# Patient Record
Sex: MALE | Race: WHITE
[De-identification: names, ages, dates, MRNs, and addresses within clinical notes are randomized per-mention and may not be internally consistent; named-entity substitution may affect disease eponyms.]

---

## 2020-07-04 ENCOUNTER — HOSPITAL ENCOUNTER (INPATIENT)
Dept: HOSPITAL 7 - FB.ED | Age: 75
LOS: 3 days | DRG: 682 | End: 2020-07-07
Attending: FAMILY MEDICINE | Admitting: FAMILY MEDICINE
Payer: MEDICARE

## 2020-07-04 DIAGNOSIS — I50.9: ICD-10-CM

## 2020-07-04 DIAGNOSIS — E87.6: ICD-10-CM
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
## 2020-07-04 DIAGNOSIS — R53.1: ICD-10-CM

## 2020-07-04 DIAGNOSIS — R15.9: ICD-10-CM

## 2020-07-04 DIAGNOSIS — Z90.49: ICD-10-CM

## 2020-07-04 DIAGNOSIS — I10: ICD-10-CM

## 2020-07-04 DIAGNOSIS — K72.00: ICD-10-CM

## 2020-07-04 DIAGNOSIS — Z90.89: ICD-10-CM

## 2020-07-04 DIAGNOSIS — F03.90: ICD-10-CM

## 2020-07-04 DIAGNOSIS — Z66: ICD-10-CM

## 2020-07-04 DIAGNOSIS — Z51.5: ICD-10-CM

## 2020-07-04 DIAGNOSIS — F17.210: ICD-10-CM

## 2020-07-04 DIAGNOSIS — R63.0: ICD-10-CM

## 2020-07-04 DIAGNOSIS — Z20.828: ICD-10-CM

## 2020-07-04 DIAGNOSIS — G93.41: ICD-10-CM

## 2020-07-04 DIAGNOSIS — R82.71: ICD-10-CM

## 2020-07-04 DIAGNOSIS — K72.90: ICD-10-CM

## 2020-07-04 DIAGNOSIS — R32: ICD-10-CM

## 2020-07-04 DIAGNOSIS — F32.9: ICD-10-CM

## 2020-07-04 DIAGNOSIS — Z79.899: ICD-10-CM

## 2020-07-04 DIAGNOSIS — F17.200: ICD-10-CM

## 2020-07-04 DIAGNOSIS — I11.0: ICD-10-CM

## 2020-07-04 DIAGNOSIS — E86.0: ICD-10-CM

## 2020-07-04 DIAGNOSIS — N17.9: Primary | ICD-10-CM

## 2020-07-04 DIAGNOSIS — R64: ICD-10-CM

## 2020-07-04 DIAGNOSIS — F10.21: ICD-10-CM

## 2020-07-04 DIAGNOSIS — Z79.82: ICD-10-CM

## 2020-07-04 PROCEDURE — U0002 COVID-19 LAB TEST NON-CDC: HCPCS

## 2020-07-04 RX ADMIN — Medication PRN ML: at 10:20

## 2020-07-04 RX ADMIN — Medication PRN ML: at 20:51

## 2020-07-04 RX ADMIN — SODIUM CHLORIDE AND POTASSIUM CHLORIDE SCH MLS/HR: .9; .15 SOLUTION INTRAVENOUS at 20:12

## 2020-07-04 RX ADMIN — SODIUM CHLORIDE AND POTASSIUM CHLORIDE SCH MLS/HR: .9; .15 SOLUTION INTRAVENOUS at 10:22

## 2020-07-04 RX ADMIN — POTASSIUM CHLORIDE SCH MLS/HR: 200 INJECTION, SOLUTION INTRAVENOUS at 18:32

## 2020-07-04 RX ADMIN — POTASSIUM CHLORIDE SCH MLS/HR: 200 INJECTION, SOLUTION INTRAVENOUS at 23:24

## 2020-07-04 NOTE — PCM.HP.2
H&P History of Present Illness





- General


Date of Service: 07/04/20


Admit Problem/Dx: 


                           Admission Diagnosis/Problem





Admission Diagnosis/Problem      Hypokalemia








Source of Information: Patient, Family, Old Records


History Limitations: Reports: No Limitations





- History of Present Illness


Initial Comments - Free Text/Narative: 


Jaspreet presents because of weakness, a fall at home, anorexia/weight loss. His 

wife reported that the symptoms have been progressively worsening over the last 

few months but last week he has hardly eaten anything. He has a history of 

hypertension, tobacco abuse. He does endorse feeling feeling depressed and tired

2 days lost excess amount of weight. There's been no vomiting ,but he does 

endorse chronic diarrhea. No chest pain or shortness of breath.





- Related Data


Allergies/Adverse Reactions: 


                                    Allergies











Allergy/AdvReac Type Severity Reaction Status Date / Time


 


No Known Allergies Allergy   Unverified 07/04/20 09:11











Home Medications: 


                                    Home Meds





Aspirin 81 mg PO DAILY 07/04/20 [History]


Cholecalciferol (Vitamin D3) [D3-2000] 125 mcg PO DAILY 07/04/20 [History]


Iron 27 mg PO DAILY 07/04/20 [History]


Lisinopril/Hydrochlorothiazide [Lisinopril-Hctz 20-12.5 mg Tab] 1 tab DAILY 0

7/04/20 [History]











Past Medical History


HEENT History: Reports: Epistaxis


Cardiovascular History: Reports: Hypertension


Gastrointestinal History: Reports: None


Genitourinary History: Reports: None


Neurological History: Reports: None


Psychiatric History: Reports: Addiction, Dementia, Depression


Other Psychiatric History: hx etoh abuse





- Infectious Disease History


Infectious Disease History: Reports: Chicken Pox, Measles, Mumps





- Past Surgical History


HEENT Surgical History: Reports: Tonsillectomy, Other (See Below)


Other HEENT Surgeries/Procedures: remove teeth for dentures


Cardiovascular Surgical History: Reports: None


GI Surgical History: Reports: Appendectomy


Male  Surgical History: Reports: Circumcision


Neurological Surgical History: Reports: None


Dermatological Surgical History: Reports: None





Social & Family History





- Family History


Family Medical History: Noncontributory





- Tobacco Use


Smoking Status *Q: Current Every Day Smoker


Years of Tobacco use: 60


Packs/Tins Daily: 0.5


Used Tobacco, but Quit: No


Second Hand Smoke Exposure: No





- Caffeine Use


Caffeine Use: Reports: Soda





- Recreational Drug Use


Recreational Drug Use: No





H&P Review of Systems





- Review of Systems:


Review Of Systems: Comprehensive ROS is negative, except as noted in HPI.





Exam





- Exam


Exam: See Below





- Vital Signs


Vital Signs: 


                                Last Vital Signs











Temp  97 F   07/04/20 16:00


 


Pulse  56 L  07/04/20 16:00


 


Resp  16   07/04/20 16:00


 


BP  99/62   07/04/20 16:00


 


Pulse Ox  96   07/04/20 16:00











Weight: 51.982 kg





- Exam


Quality Assessment: Skin Breakdown


General: Lethargic, Other (Cachetitic,poorly groomed.)


HEENT: PERRLA, Hearing Intact, Mucosa Moist & Pink, Nares Patent, Normal Nasal 

Septum, Posterior Pharynx Clear, Conjunctiva Clear, EOMI, EACs Clear, TMs Clear


Neck: Supple, Trachea Midline, 2


Lungs: Clear to Auscultation, Normal Respiratory Effort


Cardiovascular: Regular Rate, Regular Rhythm


GI/Abdominal Exam: Normal Bowel Sounds, Soft, Non-Tender, No Organomegaly, No 

Distention, No Abnormal Bruit, No Mass, Pelvis Stable


 (Male) Exam: Deferred


Rectal (Males) Exam: Deferred


Back Exam: Normal Inspection, Full Range of Motion, NT


Extremities: Normal Inspection, Normal Range of Motion, Non-Tender, No Pedal 

Edema, Normal Capillary Refill


Skin: Warm, Dry, Intact


Neurological: Cranial Nerves Intact, Reflexes Equal Bilateral


Neuro Extensive - Mental Status: Alert, Oriented x3, Normal Mood/Affect, Normal 

Cognition


Neuro Extensive - Motor, Sensory, Reflexes: CN II-XII Intact, Normal Gait, 

Normal Reflexes


Psychiatric: Alert, Normal Affect, Normal Mood





- Patient Data


Lab Results Last 24 hrs: 


                         Laboratory Results - last 24 hr











  07/04/20 07/04/20 Range/Units





  09:00 09:00 


 


WBC   12.4 H  (4.5-12.0)  X10-3/uL


 


RBC   4.16 L  (4.30-5.75)  x10(6)uL


 


Hgb   12.9 L  (13.5-17.8)  g/dL


 


Hct   39.3  (30.0-51.3)  %


 


MCV   94.7  (80-96)  fL


 


MCH   31.0  (27.7-33.6)  pg


 


MCHC   32.8  (32.2-35.4)  g/dL


 


RDW   15.4  (11.5-15.5)  %


 


Plt Count   318  (125-369)  X10(3)uL


 


MPV   8.6  (7.4-10.4)  fL


 


Neut % (Auto)   80.0  (46-82)  %


 


Lymph % (Auto)   15.3  (13-37)  %


 


Mono % (Auto)   4.2  (4-12)  %


 


Eos % (Auto)   0 L  (1.0-5.0)  %


 


Baso % (Auto)   0  (0-2)  %


 


Neut # (Auto)   10.0 H  (1.6-8.3)  #


 


Lymph # (Auto)   1.9  (0.6-5.0)  #


 


Mono # (Auto)   0.5  (0.0-1.3)  #


 


Eos # (Auto)   0.0  (0.0-0.8)  #


 


Baso # (Auto)   0.0  (0.0-0.2)  #


 


Sodium  141   (135-145)  mmol/L


 


Potassium  < 1.5 L*   (3.5-5.3)  mmol/L


 


Chloride  97 L   (100-110)  mmol/L


 


Carbon Dioxide  36 H   (21-32)  mmol/L


 


BUN  20 H   (7-18)  mg/dL


 


Creatinine  1.5 H   (0.70-1.30)  mg/dL


 


Est Cr Clr Drug Dosing  30.43   mL/min


 


Estimated GFR (MDRD)  45 L   (>60)  


 


BUN/Creatinine Ratio  13.3   (9-20)  


 


Glucose  123 H   ()  mg/dL


 


Calcium  7.5 L   (8.6-10.2)  mg/dL


 


Total Bilirubin  0.9   (0.1-1.3)  mg/dL


 


AST  45 H   (5-25)  IU/L


 


ALT  18   (12-36)  U/L


 


Alkaline Phosphatase  79   ()  IU/L


 


Total Protein  6.5   (6.0-8.0)  g/dL


 


Albumin  2.4 L   (3.2-4.6)  g/dL


 


Globulin  4.1   g/dL


 


Albumin/Globulin Ratio  0.6   











Result Diagrams: 


                                 07/04/20 09:00





                                 07/04/20 09:00





EKG INTERPRETATION


EKG Date: 07/04/20


Rhythm: NSR


ST-T: Depressed


Comparison: NA - No Prior EKG





Sepsis Event Note





- Evaluation


Sepsis Screening Result: No Definite Risk





- Focused Exam


Vital Signs: 


                                   Vital Signs











  Temp Temp Pulse Resp BP Pulse Ox


 


 07/04/20 16:00   97 F  56 L  16  99/62  96


 


 07/04/20 13:20  98.5 F   58 L  16  115/74  98


 


 07/04/20 12:03    58 L  18  147/102 H  98


 


 07/04/20 10:57    68  18  119/72  93 L


 


 07/04/20 09:31    51 L  18  120/58 L  99


 


 07/04/20 08:51   98.6 F  107 H  18  110/72  98











Date Exam was Performed: 07/04/20


Time Exam was Performed: 17:53





- Problem List


(1) Cachexia


SNOMED Code(s): 944469152


   ICD Code: R64 - CACHEXIA   Status: Acute   Current Visit: Yes   





(2) Tobacco abuse


SNOMED Code(s): 635843420


   ICD Code: Z72.0 - TOBACCO USE   Status: Acute   Current Visit: Yes   





(3) HTN (hypertension)


SNOMED Code(s): 27071664


   ICD Code: I10 - ESSENTIAL (PRIMARY) HYPERTENSION   Status: Acute   Current 

Visit: Yes   


Qualifiers: 


   Hypertension type: essential hypertension   Qualified Code(s): I10 - 

Essential (primary) hypertension   





(4) MDD (major depressive disorder)


SNOMED Code(s): 614427979


   ICD Code: F32.9 - MAJOR DEPRESSIVE DISORDER, SINGLE EPISODE, UNSPECIFIED   

Status: Acute   Current Visit: Yes   


Qualifiers: 


   Major depression recurrence: single episode 





(5) Hypokalemia


SNOMED Code(s): 85972222


   ICD Code: E87.6 - HYPOKALEMIA   Status: Acute   Current Visit: Yes   





(6) Anorexia


SNOMED Code(s): 17552318


   ICD Code: R63.0 - ANOREXIA   Status: Acute   Current Visit: Yes   





(7) DARREL (acute kidney injury)


SNOMED Code(s): 85393335, 84112895


   ICD Code: N17.9 - ACUTE KIDNEY FAILURE, UNSPECIFIED   Status: Acute   Current

 Visit: Yes   


Problem List Initiated/Reviewed/Updated: Yes


Orders Last 24hrs: 


                               Active Orders 24 hr











 Category Date Time Status


 


 Patient Status [ADT] Routine ADT  07/04/20 12:16 Active


 


 Oxygen Therapy [RC] PRN Care  07/04/20 12:16 Active


 


 Telemetry Monitoring [Cardiac Monitoring] [RC] .As Care  07/04/20 12:21 Active





 Directed   


 


 VTE/DVT Education [RC] Per Unit Routine Care  07/04/20 12:16 Active


 


 Vital Signs [RC] QSHIFT Care  07/04/20 12:16 Active


 


 Regular Diet [DIET] Diet  07/05/20 Breakfast Ordered


 


 Chest 1V Frontal [CR] Stat Exams  07/04/20 09:20 Taken


 


 Head wo Cont [CT] Stat Exams  07/04/20 09:20 Taken


 


 BASIC METABOLIC PANEL,BMP [CHEM] Routine Lab  07/04/20 18:00 Ordered


 


 CBC WITH AUTO DIFF [HEME] AM Lab  07/05/20 05:11 Ordered


 


 COMPREHENSIVE METABOLIC PN,CMP [CHEM] AM Lab  07/05/20 05:11 Ordered


 


 MAGNESIUM [CHEM] AM Lab  07/05/20 05:11 Ordered


 


 UA W/MICROSCOPIC [URIN] Routine Lab  07/04/20 09:19 Ordered


 


 Acetaminophen [Tylenol] Med  07/04/20 12:15 Active





 650 mg PO Q4H PRN   


 


 Aspirin [Halfprin] Med  07/05/20 09:00 Active





 81 mg PO DAILY   


 


 Cholecalciferol (Vitamin D3) [Vitamin D3] Med  07/05/20 09:00 Active





 125 mcg PO DAILY   


 


 Ibuprofen [Motrin] Med  07/04/20 12:15 Active





 600 mg PO Q6H PRN   


 


 Iron [Iron] Med  07/05/20 09:00 Pending





 27 mg PO DAILY   


 


 Megestrol [Megace 40 MG/ML Susp] Med  07/05/20 09:00 Active





 400 mg PO DAILY   


 


 NS + KCl 20mEq/L [Normal Saline with 20 mEq KCl] 1,000 Med  07/04/20 10:00 

Active





 ml   





 IV ASDIRECTED   


 


 Ondansetron [Zofran ODT] Med  07/04/20 12:15 Active





 4 mg PO Q4H PRN   


 


 Potassium Chloride [KCL 20 MEQ in Water 100 ML] 20 meq Med  07/04/20 18:00 

Active





 Premix Bag 1 bag   





 IV Q6H   


 


 Sodium Chloride 0.9% [Normal Saline] 1,000 ml Med  07/04/20 17:45 Active





 IV ONETIME   


 


 Sodium Chloride 0.9% [Saline Flush] Med  07/04/20 09:59 Active





 10 ml FLUSH ASDIRECTED PRN   


 


 Peripheral IV Insertion Adult [OM.PC] Routine Oth  07/04/20 09:59 Ordered


 


 Resuscitation Status Routine Resus Stat  07/04/20 12:15 Ordered








                                Medication Orders





Acetaminophen (Tylenol)  650 mg PO Q4H PRN


   PRN Reason: Pain (Mild 1-3)/fever


Aspirin (Halfprin)  81 mg PO DAILY FRANCIS


Cholecalciferol (Vitamin D3)  125 mcg PO DAILY FRANCIS


Potassium Chloride/Sodium Chloride (Normal Saline With 20 Meq Kcl)  1,000 mls @ 

100 mls/hr IV ASDIRECTED FRANCIS


   Last Admin: 07/04/20 10:22  Dose: 100 mls/hr


   Documented by: MAGDALENA


Sodium Chloride (Normal Saline)  1,000 mls @ 999 mls/hr IV ONETIME ONE


   Stop: 07/04/20 18:45


Potassium Chloride 20 meq/ (Premix)  100 mls @ 50 mls/hr IV Q6H FRANCIS


Ibuprofen (Motrin)  600 mg PO Q6H PRN


   PRN Reason: Pain (mild 1-3)


Megestrol Acetate (Megace 40 Mg/Ml Susp)  400 mg PO DAILY [facility]


Non-Formulary Medication (Iron [Iron])  27 mg PO DAILY FRANCIS


Ondansetron HCl (Zofran Odt)  4 mg PO Q4H PRN


   PRN Reason: nausea, able to take PO


Sodium Chloride (Saline Flush)  10 ml FLUSH ASDIRECTED PRN


   PRN Reason: Keep Vein Open


   Last Admin: 07/04/20 10:20  Dose: 10 ml


   Documented by: MAGDALENA








Assessment/Plan Comment:: 


I will give him fluids, and replace potassium. I've added Megace to help his 

appetite and will start an antidepressant,mirtazapine. He probably needs further

 workup possibly CT chest abdomen pelvis colonoscopy if he so agrees.

## 2020-07-04 NOTE — EDM.PDOC
ED HPI GENERAL MEDICAL PROBLEM





- General


Chief Complaint: General


Stated Complaint: WEAKNESS


Time Seen by Provider: 07/04/20 09:00


Source of Information: Reports: Patient, Family


History Limitations: Reports: Other (dementia)





- History of Present Illness


INITIAL COMMENTS - FREE TEXT/NARRATIVE: 





pt with Hx of dementia, comes by EMS from home with wife, with concerns for gen 

weakness, confusion, poor feeding and unwitnessed fall this morning with no 

injuries, pt here on arrival has stable vitals , alert and follow simple 

commands, he has dementia and unable to provide Hx, pt Hx was obtained from his 

wife, she report chronic confusion for the past 3 years and gradual decline in 

pt condition since then, states pt is hardly leaving his bed and hardly eat 

anything lately, states he is frequently incontinent to stool and urine, 

continue to smoke and denies alcohol use. 





- Related Data


                                    Allergies











Allergy/AdvReac Type Severity Reaction Status Date / Time


 


No Known Allergies Allergy   Unverified 07/04/20 09:11











Home Meds: 


                                    Home Meds





Aspirin 81 mg PO DAILY 07/04/20 [History]


Cholecalciferol (Vitamin D3) [Vitamin D3] 1,000 unit PO DAILY 07/04/20 [History]


Lisinopril/Hydrochlorothiazide [Lisinopril-Hctz 20-12.5 mg Tab] 1 tab DAILY 

07/04/20 [History]











Past Medical History


Cardiovascular History: Reports: Hypertension


Psychiatric History: Reports: Addiction, Dementia


Other Psychiatric History: hx etoh abuse





- Infectious Disease History


Infectious Disease History: Reports: Chicken Pox, Measles, Mumps





- Past Surgical History


GI Surgical History: Reports: Appendectomy





Social & Family History





- Family History


Family Medical History: Noncontributory





- Tobacco Use


Smoking Status *Q: Current Every Day Smoker


Years of Tobacco use: 55


Packs/Tins Daily: 1





- Caffeine Use


Caffeine Use: Reports: Soda





- Recreational Drug Use


Recreational Drug Use: No





ED ROS GENERAL





- Review of Systems


Review Of Systems: See Below (secondary to dementia.)


Constitutional: Denies: Fever





ED EXAM, GENERAL





- Physical Exam


Exam: See Below


Exam Limited By: Altered Mental Status


General Appearance: Mild Distress, Other (pt is disheveled and cachectic)


Ears: Normal External Exam


Nose: Normal Inspection, Normal Mucosa


Throat/Mouth: Normal Inspection


Head: Atraumatic


Neck: Normal Inspection, Supple


Respiratory/Chest: No Respiratory Distress, Lungs Clear


Cardiovascular: Normal Peripheral Pulses, Regular Rate, Rhythm


GI/Abdominal: Normal Bowel Sounds, Soft, Non-Tender, No Distention


Back Exam: Normal Inspection


Extremities: Normal Inspection, Normal Range of Motion


Neurological: Alert, CN II-XII Intact, Normal Reflexes.  No: Normal Cognition


Psychiatric: Anxious


Skin Exam: Warm





Course





- Vital Signs


Text/Narrative:: 





labs/ imaging / EKG results were explained to pt wife... pt has progressive 

deconditioning and critical hypokalemia .


will admit inpatient on tell , hold lisinopril/HCT , replace hypokalemia .


pt does not have advanced directives and his  wife indicated her wishes for a no

 code status for the pt. 


discussed with Dr Samuels and he was in acceptance of pt care.   


Last Recorded V/S: 


                                Last Vital Signs











Temp  37.0 C   07/04/20 08:51


 


Pulse  107 H  07/04/20 08:51


 


Resp  18   07/04/20 08:51


 


BP  110/72   07/04/20 08:51


 


Pulse Ox  98   07/04/20 08:51














- Orders/Labs/Meds


Orders: 


                               Active Orders 24 hr











 Category Date Time Status


 


 Chest 1V Frontal [CR] Stat Exams  07/04/20 09:20 Taken


 


 Head wo Cont [CT] Stat Exams  07/04/20 09:20 Taken


 


 UA W/MICROSCOPIC [URIN] Routine Lab  07/04/20 09:19 Ordered


 


 NS + KCl 20mEq/L [Normal Saline with 20 mEq KCl] 1,000 Med  07/04/20 10:00 

Active





 ml   





 IV ASDIRECTED   


 


 Potassium Chloride [KCL 20 MEQ in Water 100 ML] 20 meq Med  07/04/20 11:00 

Active





 Premix Bag 1 bag   





 IV ONETIME   


 


 Sodium Chloride 0.9% [Saline Flush] Med  07/04/20 09:59 Active





 10 ml FLUSH ASDIRECTED PRN   


 


 Peripheral IV Insertion Adult [OM.PC] Routine Oth  07/04/20 09:59 Ordered








                                Medication Orders





Potassium Chloride/Sodium Chloride (Normal Saline With 20 Meq Kcl)  1,000 mls @ 

100 mls/hr IV ASDIRECTED FRANCIS


   Last Admin: 07/04/20 10:22  Dose: 100 mls/hr


   Documented by: MAGDALENA


Potassium Chloride 20 meq/ (Premix)  100 mls @ 50 mls/hr IV ONETIME ONE


   Stop: 07/04/20 12:59


Sodium Chloride (Saline Flush)  10 ml FLUSH ASDIRECTED PRN


   PRN Reason: Keep Vein Open


   Last Admin: 07/04/20 10:20  Dose: 10 ml


   Documented by: MAGDALENA








Labs: 


                                Laboratory Tests











  07/04/20 07/04/20 Range/Units





  09:00 09:00 


 


WBC   12.4 H  (4.5-12.0)  X10-3/uL


 


RBC   4.16 L  (4.30-5.75)  x10(6)uL


 


Hgb   12.9 L  (13.5-17.8)  g/dL


 


Hct   39.3  (30.0-51.3)  %


 


MCV   94.7  (80-96)  fL


 


MCH   31.0  (27.7-33.6)  pg


 


MCHC   32.8  (32.2-35.4)  g/dL


 


RDW   15.4  (11.5-15.5)  %


 


Plt Count   318  (125-369)  X10(3)uL


 


MPV   8.6  (7.4-10.4)  fL


 


Neut % (Auto)   80.0  (46-82)  %


 


Lymph % (Auto)   15.3  (13-37)  %


 


Mono % (Auto)   4.2  (4-12)  %


 


Eos % (Auto)   0 L  (1.0-5.0)  %


 


Baso % (Auto)   0  (0-2)  %


 


Neut # (Auto)   10.0 H  (1.6-8.3)  #


 


Lymph # (Auto)   1.9  (0.6-5.0)  #


 


Mono # (Auto)   0.5  (0.0-1.3)  #


 


Eos # (Auto)   0.0  (0.0-0.8)  #


 


Baso # (Auto)   0.0  (0.0-0.2)  #


 


Sodium  141   (135-145)  mmol/L


 


Potassium  < 1.5 L*   (3.5-5.3)  mmol/L


 


Chloride  97 L   (100-110)  mmol/L


 


Carbon Dioxide  36 H   (21-32)  mmol/L


 


BUN  20 H   (7-18)  mg/dL


 


Creatinine  1.5 H   (0.70-1.30)  mg/dL


 


Est Cr Clr Drug Dosing  30.43   mL/min


 


Estimated GFR (MDRD)  45 L   (>60)  


 


BUN/Creatinine Ratio  13.3   (9-20)  


 


Glucose  123 H   ()  mg/dL


 


Calcium  7.5 L   (8.6-10.2)  mg/dL


 


Total Bilirubin  0.9   (0.1-1.3)  mg/dL


 


AST  45 H   (5-25)  IU/L


 


ALT  18   (12-36)  U/L


 


Alkaline Phosphatase  79   ()  IU/L


 


Total Protein  6.5   (6.0-8.0)  g/dL


 


Albumin  2.4 L   (3.2-4.6)  g/dL


 


Globulin  4.1   g/dL


 


Albumin/Globulin Ratio  0.6   











Meds: 


Medications











Generic Name Dose Route Start Last Admin





  Trade Name Freq  PRN Reason Stop Dose Admin


 


Potassium Chloride/Sodium Chloride  1,000 mls @ 100 mls/hr  07/04/20 10:00  

07/04/20 10:22





  Normal Saline With 20 Meq Kcl  IV   100 mls/hr





  ASDIRECTED FRANCIS   Administration


 


Potassium Chloride 20 meq/  100 mls @ 50 mls/hr  07/04/20 11:00 





  Premix  IV  07/04/20 12:59 





  ONETIME ONE  


 


Sodium Chloride  10 ml  07/04/20 09:59  07/04/20 10:20





  Saline Flush  FLUSH   10 ml





  ASDIRECTED PRN   Administration





  Keep Vein Open  














Discontinued Medications














Generic Name Dose Route Start Last Admin





  Trade Name Freq  PRN Reason Stop Dose Admin


 


Potassium Chloride 20 meq/  100 mls @ 50 mls/hr  07/04/20 09:59  07/04/20 10:24





  Premix  IV  07/04/20 11:58  50 mls/hr





  ONETIME ONE   Administration


 


Potassium Chloride  20 meq  07/04/20 11:00 





  Potassium Chloride  IV  07/04/20 11:01 





  ONETIME ONE  














Departure





- Departure


Time of Disposition: 12:09


Disposition: Admitted As Inpatient 64


Clinical Impression: 


 Hypokalemia








- Discharge Information


Referrals: 


Yen Reyes NP [Primary Care Provider] - 


Forms:  ED Department Discharge





Sepsis Event Note (ED)





- Evaluation


Sepsis Screening Result: No Definite Risk





- Focused Exam


Vital Signs: 


                                   Vital Signs











  Temp Pulse Resp BP Pulse Ox


 


 07/04/20 08:51  37.0 C  107 H  18  110/72  98














- My Orders


Last 24 Hours: 


My Active Orders





07/04/20 09:19


UA W/MICROSCOPIC [URIN] Routine 





07/04/20 09:20


Chest 1V Frontal [CR] Stat 


Head wo Cont [CT] Stat 





07/04/20 09:59


Sodium Chloride 0.9% [Saline Flush]   10 ml FLUSH ASDIRECTED PRN 


Peripheral IV Insertion Adult [OM.PC] Routine 





07/04/20 10:00


NS + KCl 20mEq/L [Normal Saline with 20 mEq KCl] 1,000 ml IV ASDIRECTED 





07/04/20 11:00


Potassium Chloride [KCL 20 MEQ in Water 100 ML] 20 meq   Premix Bag 1 bag IV 

ONETIME 














- Assessment/Plan


Last 24 Hours: 


My Active Orders





07/04/20 09:19


UA W/MICROSCOPIC [URIN] Routine 





07/04/20 09:20


Chest 1V Frontal [CR] Stat 


Head wo Cont [CT] Stat 





07/04/20 09:59


Sodium Chloride 0.9% [Saline Flush]   10 ml FLUSH ASDIRECTED PRN 


Peripheral IV Insertion Adult [OM.PC] Routine 





07/04/20 10:00


NS + KCl 20mEq/L [Normal Saline with 20 mEq KCl] 1,000 ml IV ASDIRECTED 





07/04/20 11:00


Potassium Chloride [KCL 20 MEQ in Water 100 ML] 20 meq   Premix Bag 1 bag IV 

ONETIME

## 2020-07-05 RX ADMIN — SODIUM CHLORIDE AND POTASSIUM CHLORIDE SCH MLS/HR: .9; .15 SOLUTION INTRAVENOUS at 07:27

## 2020-07-05 RX ADMIN — Medication PRN ML: at 18:00

## 2020-07-05 RX ADMIN — Medication PRN ML: at 18:30

## 2020-07-05 RX ADMIN — POTASSIUM CHLORIDE SCH MEQ: 1500 TABLET, EXTENDED RELEASE ORAL at 14:15

## 2020-07-05 RX ADMIN — POTASSIUM CHLORIDE SCH MLS/HR: 200 INJECTION, SOLUTION INTRAVENOUS at 23:29

## 2020-07-05 RX ADMIN — POTASSIUM CHLORIDE SCH MLS/HR: 200 INJECTION, SOLUTION INTRAVENOUS at 18:12

## 2020-07-05 RX ADMIN — Medication PRN ML: at 17:00

## 2020-07-05 RX ADMIN — MEGESTROL ACETATE SCH MG: 40 SUSPENSION ORAL at 12:48

## 2020-07-05 RX ADMIN — POTASSIUM CHLORIDE SCH MLS/HR: 200 INJECTION, SOLUTION INTRAVENOUS at 05:07

## 2020-07-05 RX ADMIN — POTASSIUM CHLORIDE SCH MEQ: 1500 TABLET, EXTENDED RELEASE ORAL at 20:24

## 2020-07-05 RX ADMIN — POTASSIUM CHLORIDE SCH MLS/HR: 200 INJECTION, SOLUTION INTRAVENOUS at 12:25

## 2020-07-05 RX ADMIN — SODIUM CHLORIDE AND POTASSIUM CHLORIDE SCH MLS/HR: .9; .15 SOLUTION INTRAVENOUS at 17:58

## 2020-07-05 RX ADMIN — VITAMIN D, TAB 1000IU (100/BT) SCH MCG: 25 TAB at 11:08

## 2020-07-05 NOTE — PCM.PN
- General Info


Date of Service: 07/05/20


Subjective Update: 


He feels weak,wants to go home. Agitated at times.


Functional Status: Reports: Pain Controlled





- Review of Systems


HEENT: Reports: No Symptoms


Pulmonary: Reports: No Symptoms


Cardiovascular: Reports: No Symptoms


Gastrointestinal: Reports: No Symptoms





- Patient Data


Vitals - Most Recent: 


                                Last Vital Signs











Temp  98.1 F   07/05/20 07:23


 


Pulse  90   07/05/20 00:00


 


Resp  20   07/05/20 07:23


 


BP  127/84   07/05/20 07:23


 


Pulse Ox  99   07/05/20 07:23











Weight - Most Recent: 51.982 kg


I&O - Last 24 Hours: 


                                 Intake & Output











 07/04/20 07/05/20 07/05/20





 22:59 06:59 14:59


 


Intake Total 100 1856 


 


Balance 100 1856 











Lab Results Last 24 Hours: 


                         Laboratory Results - last 24 hr











  07/04/20 07/04/20 07/04/20 Range/Units





  09:00 09:00 18:00 


 


WBC   12.4 H   (4.5-12.0)  X10-3/uL


 


RBC   4.16 L   (4.30-5.75)  x10(6)uL


 


Hgb   12.9 L   (13.5-17.8)  g/dL


 


Hct   39.3   (30.0-51.3)  %


 


MCV   94.7   (80-96)  fL


 


MCH   31.0   (27.7-33.6)  pg


 


MCHC   32.8   (32.2-35.4)  g/dL


 


RDW   15.4   (11.5-15.5)  %


 


Plt Count   318   (125-369)  X10(3)uL


 


MPV   8.6   (7.4-10.4)  fL


 


Neut % (Auto)   80.0   (46-82)  %


 


Lymph % (Auto)   15.3   (13-37)  %


 


Mono % (Auto)   4.2   (4-12)  %


 


Eos % (Auto)   0 L   (1.0-5.0)  %


 


Baso % (Auto)   0   (0-2)  %


 


Neut # (Auto)   10.0 H   (1.6-8.3)  #


 


Lymph # (Auto)   1.9   (0.6-5.0)  #


 


Mono # (Auto)   0.5   (0.0-1.3)  #


 


Eos # (Auto)   0.0   (0.0-0.8)  #


 


Baso # (Auto)   0.0   (0.0-0.2)  #


 


Sodium  141   141  (135-145)  mmol/L


 


Potassium  < 1.5 L*   1.6 L*  (3.5-5.3)  mmol/L


 


Chloride  97 L   100  (100-110)  mmol/L


 


Carbon Dioxide  36 H   34 H  (21-32)  mmol/L


 


BUN  20 H   21 H  (7-18)  mg/dL


 


Creatinine  1.5 H   1.3  (0.70-1.30)  mg/dL


 


Est Cr Clr Drug Dosing  30.43   34.99  mL/min


 


Estimated GFR (MDRD)  45 L   54 L  (>60)  


 


BUN/Creatinine Ratio  13.3   16.2  (9-20)  


 


Glucose  123 H   116  ()  mg/dL


 


Calcium  7.5 L   7.1 L  (8.6-10.2)  mg/dL


 


Magnesium     (1.8-2.5)  mg/dL


 


Total Bilirubin  0.9    (0.1-1.3)  mg/dL


 


AST  45 H    (5-25)  IU/L


 


ALT  18    (12-36)  U/L


 


Alkaline Phosphatase  79    ()  IU/L


 


Total Protein  6.5    (6.0-8.0)  g/dL


 


Albumin  2.4 L    (3.2-4.6)  g/dL


 


Globulin  4.1    g/dL


 


Albumin/Globulin Ratio  0.6    


 


Urine Color     (YELLOW)  


 


Urine Appearance     (CLEAR)  


 


Urine pH     (5.0-6.5)  


 


Ur Specific Gravity     (1.010-1.025)  


 


Urine Protein     (NEGATIVE)  mg/dL


 


Urine Glucose (UA)     (NORMAL)  mg/dL


 


Urine Ketones     (NEGATIVE)  mg/dL


 


Urine Occult Blood     (NEGATIVE)  


 


Urine Nitrite     (NEGATIVE)  


 


Urine Bilirubin     (NEGATIVE)  


 


Urine Urobilinogen     (NEGATIVE)  mg/dL


 


Ur Leukocyte Esterase     (NEGATIVE)  


 


SARS Virus RNA (PCR)     (NEGATIVE)  














  07/04/20 07/05/20 07/05/20 Range/Units





  22:00 01:47 06:30 


 


WBC    10.5  (4.5-12.0)  X10-3/uL


 


RBC    3.48 L  (4.30-5.75)  x10(6)uL


 


Hgb    11.0 L  (13.5-17.8)  g/dL


 


Hct    32.8  (30.0-51.3)  %


 


MCV    94.4  (80-96)  fL


 


MCH    31.8  (27.7-33.6)  pg


 


MCHC    33.6  (32.2-35.4)  g/dL


 


RDW    15.7 H  (11.5-15.5)  %


 


Plt Count    253  (125-369)  X10(3)uL


 


MPV    8.9  (7.4-10.4)  fL


 


Neut % (Auto)    74.7  (46-82)  %


 


Lymph % (Auto)    20.2  (13-37)  %


 


Mono % (Auto)    3.4 L  (4-12)  %


 


Eos % (Auto)    1  (1.0-5.0)  %


 


Baso % (Auto)    1  (0-2)  %


 


Neut # (Auto)    7.8  (1.6-8.3)  #


 


Lymph # (Auto)    2.1  (0.6-5.0)  #


 


Mono # (Auto)    0.4  (0.0-1.3)  #


 


Eos # (Auto)    0.1  (0.0-0.8)  #


 


Baso # (Auto)    0.1  (0.0-0.2)  #


 


Sodium     (135-145)  mmol/L


 


Potassium     (3.5-5.3)  mmol/L


 


Chloride     (100-110)  mmol/L


 


Carbon Dioxide     (21-32)  mmol/L


 


BUN     (7-18)  mg/dL


 


Creatinine     (0.70-1.30)  mg/dL


 


Est Cr Clr Drug Dosing     mL/min


 


Estimated GFR (MDRD)     (>60)  


 


BUN/Creatinine Ratio     (9-20)  


 


Glucose     ()  mg/dL


 


Calcium     (8.6-10.2)  mg/dL


 


Magnesium     (1.8-2.5)  mg/dL


 


Total Bilirubin     (0.1-1.3)  mg/dL


 


AST     (5-25)  IU/L


 


ALT     (12-36)  U/L


 


Alkaline Phosphatase     ()  IU/L


 


Total Protein     (6.0-8.0)  g/dL


 


Albumin     (3.2-4.6)  g/dL


 


Globulin     g/dL


 


Albumin/Globulin Ratio     


 


Urine Color   Yellow   (YELLOW)  


 


Urine Appearance   Slightly cloudy   (CLEAR)  


 


Urine pH   6.0   (5.0-6.5)  


 


Ur Specific Gravity   1.020   (1.010-1.025)  


 


Urine Protein   30 H   (NEGATIVE)  mg/dL


 


Urine Glucose (UA)   Normal   (NORMAL)  mg/dL


 


Urine Ketones   Negative   (NEGATIVE)  mg/dL


 


Urine Occult Blood   Moderate H   (NEGATIVE)  


 


Urine Nitrite   Negative   (NEGATIVE)  


 


Urine Bilirubin   Small H   (NEGATIVE)  


 


Urine Urobilinogen   Normal   (NEGATIVE)  mg/dL


 


Ur Leukocyte Esterase   Large H   (NEGATIVE)  


 


SARS Virus RNA (PCR)  Negative    (NEGATIVE)  














  07/05/20 Range/Units





  06:30 


 


WBC   (4.5-12.0)  X10-3/uL


 


RBC   (4.30-5.75)  x10(6)uL


 


Hgb   (13.5-17.8)  g/dL


 


Hct   (30.0-51.3)  %


 


MCV   (80-96)  fL


 


MCH   (27.7-33.6)  pg


 


MCHC   (32.2-35.4)  g/dL


 


RDW   (11.5-15.5)  %


 


Plt Count   (125-369)  X10(3)uL


 


MPV   (7.4-10.4)  fL


 


Neut % (Auto)   (46-82)  %


 


Lymph % (Auto)   (13-37)  %


 


Mono % (Auto)   (4-12)  %


 


Eos % (Auto)   (1.0-5.0)  %


 


Baso % (Auto)   (0-2)  %


 


Neut # (Auto)   (1.6-8.3)  #


 


Lymph # (Auto)   (0.6-5.0)  #


 


Mono # (Auto)   (0.0-1.3)  #


 


Eos # (Auto)   (0.0-0.8)  #


 


Baso # (Auto)   (0.0-0.2)  #


 


Sodium  145  (135-145)  mmol/L


 


Potassium  1.8 L*  (3.5-5.3)  mmol/L


 


Chloride  105  D  (100-110)  mmol/L


 


Carbon Dioxide  29  (21-32)  mmol/L


 


BUN  17  (7-18)  mg/dL


 


Creatinine  1.1  (0.70-1.30)  mg/dL


 


Est Cr Clr Drug Dosing  41.35  mL/min


 


Estimated GFR (MDRD)  > 60  (>60)  


 


BUN/Creatinine Ratio  15.5  (9-20)  


 


Glucose  99  ()  mg/dL


 


Calcium  6.8 L  (8.6-10.2)  mg/dL


 


Magnesium  1.3 L  (1.8-2.5)  mg/dL


 


Total Bilirubin  0.7  (0.1-1.3)  mg/dL


 


AST  67 H D  (5-25)  IU/L


 


ALT  20  D  (12-36)  U/L


 


Alkaline Phosphatase  69  ()  IU/L


 


Total Protein  5.6 L  (6.0-8.0)  g/dL


 


Albumin  2.1 L  (3.2-4.6)  g/dL


 


Globulin  3.5  g/dL


 


Albumin/Globulin Ratio  0.6  


 


Urine Color   (YELLOW)  


 


Urine Appearance   (CLEAR)  


 


Urine pH   (5.0-6.5)  


 


Ur Specific Gravity   (1.010-1.025)  


 


Urine Protein   (NEGATIVE)  mg/dL


 


Urine Glucose (UA)   (NORMAL)  mg/dL


 


Urine Ketones   (NEGATIVE)  mg/dL


 


Urine Occult Blood   (NEGATIVE)  


 


Urine Nitrite   (NEGATIVE)  


 


Urine Bilirubin   (NEGATIVE)  


 


Urine Urobilinogen   (NEGATIVE)  mg/dL


 


Ur Leukocyte Esterase   (NEGATIVE)  


 


SARS Virus RNA (PCR)   (NEGATIVE)  











Med Orders - Current: 


                               Current Medications





Acetaminophen (Tylenol)  650 mg PO Q4H PRN


   PRN Reason: Pain (Mild 1-3)/fever


   Last Admin: 07/05/20 04:01 Dose:  650 mg


   Documented by: 


Aspirin (Halfprin)  81 mg PO DAILY [facility]


Ceftriaxone Sodium (Rocephin)  1 gm IVPUSH Q24H [facility]


Cholecalciferol (Vitamin D3)  125 mcg PO DAILY [facility]


Potassium Chloride/Sodium Chloride (Normal Saline With 20 Meq Kcl)  1,000 mls @ 

100 mls/hr IV ASDIRECTED [facility]


   Last Admin: 07/05/20 07:27 Dose:  100 mls/hr


   Documented by: 


Potassium Chloride 20 meq/ (Premix)  100 mls @ 50 mls/hr IV Q6H [facility]


   Last Admin: 07/05/20 05:07 Dose:  50 mls/hr


   Documented by: 


Ibuprofen (Motrin)  600 mg PO Q6H PRN


   PRN Reason: Pain (mild 1-3)


Megestrol Acetate (Megace 40 Mg/Ml Susp)  400 mg PO DAILY [facility]


Mirtazapine (Remeron)  30 mg PO BEDTIME [facility]


   Last Admin: 07/04/20 20:27 Dose:  30 mg


   Documented by: 


Non-Formulary Medication (Iron [Iron])  27 mg PO DAILY [facility]


Ondansetron HCl (Zofran Odt)  4 mg PO Q4H PRN


   PRN Reason: nausea, able to take PO


Potassium Chloride (Klor-Con M20)  40 meq PO TID [facility]


Sodium Chloride (Saline Flush)  10 ml FLUSH ASDIRECTED PRN


   PRN Reason: Keep Vein Open


   Last Admin: 07/04/20 20:51 Dose:  10 ml


   Documented by: 





Discontinued Medications





Potassium Chloride 20 meq/ (Premix)  100 mls @ 50 mls/hr IV ONETIME ONE


   Stop: 07/04/20 11:58


   Last Admin: 07/04/20 10:24 Dose:  50 mls/hr


   Documented by: 


Potassium Chloride 20 meq/ (Premix)  100 mls @ 50 mls/hr IV ONETIME ONE


   Stop: 07/04/20 12:59


   Last Admin: 07/04/20 13:39 Dose:  50 mls/hr


   Documented by: 


Sodium Chloride (Normal Saline)  1,000 mls @ 125 mls/hr IV ASDIRECTED [facility]


Sodium Chloride (Normal Saline)  1,000 mls @ 999 mls/hr IV ONETIME ONE


   Stop: 07/04/20 18:45


   Last Admin: 07/04/20 19:41 Dose:  999 mls/hr


   Documented by: 


Nicotine (Habitrol)  21 mg TRDERM ONETIME ONE


   Stop: 07/04/20 17:58


   Last Admin: 07/04/20 18:36 Dose:  21 mg


   Documented by: 


Potassium Chloride (Potassium Chloride)  20 meq IV ONETIME ONE


   Stop: 07/04/20 11:01











- Exam


General: Alert, Oriented, Lethargic


HEENT: Pupils Equal


Neck: Supple


Lungs: Clear to Auscultation


GI/Abdominal Exam: Normal Bowel Sounds


Skin: Warm





Sepsis Event Note





- Evaluation


Sepsis Screening Result: No Definite Risk





- Focused Exam


Vital Signs: 


                                   Vital Signs











  Temp Temp Pulse Resp BP Pulse Ox


 


 07/05/20 07:23  98.1 F    20  127/84  99


 


 07/05/20 00:00   97.2 F  90  16  108/62  98











Date Exam was Performed: 07/05/20


Time Exam was Performed: 09:14





- Problem List & Annotations


(1) Cachexia


SNOMED Code(s): 392473664


   Code(s): R64 - CACHEXIA   Status: Acute   Current Visit: Yes   





(2) Tobacco abuse


SNOMED Code(s): 046226771


   Code(s): Z72.0 - TOBACCO USE   Status: Acute   Current Visit: Yes   





(3) HTN (hypertension)


SNOMED Code(s): 20705983


   Code(s): I10 - ESSENTIAL (PRIMARY) HYPERTENSION   Status: Acute   Current 

Visit: Yes   


Qualifiers: 


   Hypertension type: essential hypertension   Qualified Code(s): I10 - 

Essential (primary) hypertension   





(4) MDD (major depressive disorder)


SNOMED Code(s): 209428850


   Code(s): F32.9 - MAJOR DEPRESSIVE DISORDER, SINGLE EPISODE, UNSPECIFIED   

Status: Acute   Current Visit: Yes   


Qualifiers: 


   Major depression recurrence: single episode 





(5) Hypokalemia


SNOMED Code(s): 81516344


   Code(s): E87.6 - HYPOKALEMIA   Status: Acute   Current Visit: Yes   





(6) Anorexia


SNOMED Code(s): 28894638


   Code(s): R63.0 - ANOREXIA   Status: Acute   Current Visit: Yes   





(7) DARREL (acute kidney injury)


SNOMED Code(s): 94878149, 58396203


   Code(s): N17.9 - ACUTE KIDNEY FAILURE, UNSPECIFIED   Status: Acute   Current 

Visit: Yes   





(8) Asymptomatic bacteriuria


SNOMED Code(s): 379333610


   Code(s): R82.71 - BACTERIURIA   Status: Acute   Current Visit: Yes   





- Problem List Review


Problem List Initiated/Reviewed/Updated: Yes





- My Orders


Last 24 Hours: 


My Active Orders





07/04/20 18:00


Potassium Chloride [KCL 20 MEQ in Water 100 ML] 20 meq   Premix Bag 1 bag IV Q6H







07/04/20 21:00


Mirtazapine [Remeron]   30 mg PO BEDTIME 





07/05/20 01:47


CULTURE URINE [RM] Routine 





07/05/20 Breakfast


Regular Diet [DIET] 





07/05/20 09:00


Aspirin [Halfprin]   81 mg PO DAILY 


Cholecalciferol (Vitamin D3) [Vitamin D3]   125 mcg PO DAILY 


Iron [Iron]   27 mg PO DAILY 


Megestrol [Megace 40 MG/ML Susp]   400 mg PO DAILY 





07/05/20 09:13


OT Evaluation and Treatment [CONS] Routine 


PT Evaluation and Treatment [CONS] Routine 





07/05/20 09:15


cefTRIAXone [Rocephin]   1 gm IVPUSH Q24H 





07/05/20 14:00


Potassium Chloride [Klor-Con M20]   40 meq PO TID 





07/06/20 05:11


COMPREHENSIVE METABOLIC PN,CMP [CHEM] AM 














- Plan


Plan:: 


I will add oral potassium. DC iron.

## 2020-07-06 RX ADMIN — POTASSIUM CHLORIDE SCH MEQ: 1500 TABLET, EXTENDED RELEASE ORAL at 13:27

## 2020-07-06 RX ADMIN — Medication PRN ML: at 10:33

## 2020-07-06 RX ADMIN — VITAMIN D, TAB 1000IU (100/BT) SCH MCG: 25 TAB at 09:14

## 2020-07-06 RX ADMIN — POTASSIUM CHLORIDE SCH MLS/HR: 200 INJECTION, SOLUTION INTRAVENOUS at 05:24

## 2020-07-06 RX ADMIN — POTASSIUM CHLORIDE SCH MEQ: 1500 TABLET, EXTENDED RELEASE ORAL at 09:09

## 2020-07-06 RX ADMIN — SODIUM CHLORIDE AND POTASSIUM CHLORIDE SCH MLS/HR: .9; .15 SOLUTION INTRAVENOUS at 17:20

## 2020-07-06 RX ADMIN — POTASSIUM CHLORIDE SCH MEQ: 1500 TABLET, EXTENDED RELEASE ORAL at 21:38

## 2020-07-06 RX ADMIN — POTASSIUM CHLORIDE SCH MLS/HR: 200 INJECTION, SOLUTION INTRAVENOUS at 18:59

## 2020-07-06 RX ADMIN — MEGESTROL ACETATE SCH MG: 40 SUSPENSION ORAL at 09:22

## 2020-07-06 RX ADMIN — POTASSIUM CHLORIDE SCH MLS/HR: 200 INJECTION, SOLUTION INTRAVENOUS at 11:27

## 2020-07-06 RX ADMIN — SODIUM CHLORIDE AND POTASSIUM CHLORIDE SCH MLS/HR: .9; .15 SOLUTION INTRAVENOUS at 04:20

## 2020-07-06 NOTE — CT
INDICATION:  Abnormal liver enzymes.



CT CHEST, ABDOMEN AND PELVIS WITH CONTRAST:  Spiral 3.75 mm axial sections were 
obtained through the chest, abdomen and pelvis with 59 cc [id number] at 1.7 
cc/second with sagittal and coronal reconstructions 07/06/20 - no comparisons.



Total exam DLP was 796.12 mGy-cm.



CT CHEST:  Examination of the chest revealed an appearance of patchy 
infiltrative changes in the right upper lobe posterior basilar segment, 
minimally in the same area of the left upper lobe and scattered throughout both 
upper lobes to a lesser degree with more prominent infiltration in the right 
lower lobe throughout most of the lobe but less prominent in the basilar areas 
on the right with relatively minimal infiltrate suggested on the left.  However,
there are bilateral pleural effusions, somewhat larger on the left than right 
for the most part of small size.  Findings may be on the basis of bilateral 
pneumonia, possibly due to aspiration.  A process such as viral pneumonia would 
be a consideration especially with somewhat peripheral appearance in the lower 
lobes.  



The heart did not appear grossly enlarged.  



No pericardial effusion was seen.  



No definite mediastinal mass was noted.  Mediastinal lymphadenopathy is moderate
and nonspecific with one somewhat prominent node precarinal in location 
measuring approximately 17 mm.  



IMPRESSION: Bilateral pleural parenchymal changes which are likely on the basis 
of pneumonia and pleuritis.  Most severe changes are on the right, etiology 
indeterminate.  Could be on the basis of aspiration or possibly viral changes 
but should be correlated clinically.  





CT ABDOMEN AND PELVIS:  Examination of the abdomen and pelvis was obtained by CT
as noted above - no comparison. 



The aorta is calcified as are proximal renal arteries, superior mesenteric 
artery, iliac, and femoral arteries.  No aneurysmal dilatation of the aorta was 
noted. 



A Womack catheter is noted in the urinary bladder with thickening of the wall 
raising question of cystitis or possibly trabeculation with cystitis felt to be 
more likely since the prostate does not appear grossly enlarged.  There did 
appear to be some air in the urinary bladder which likely is on the basis of the
Womack.  



Multiple loops of fluid-filled small bowel are noted with air-fluid levels which
may be on the basis of paralytic ileus or process such as gastroenteritis.  Gas 
and stool is noted in the colon - with diverticulosis of the sigmoid and to a 
much lesser extent descending colon but no definite evidence of diverticulitis. 




No evidence of free air is noted intraperitoneally. 



With slight dilatation of the fluid-filled loops of small bowel with air-fluid 
levels, the possibility of a distal small bowel obstruction - distal ileum would
be a consideration although a specific site of a mechanically obstructive 
process was not identified.  If a mechanically obstructive process is present, 
it would be either early or partial with the amount of gas and stool in the 
colon.  Also there is one suspicious area of possible narrowing of distal ileum 
in the right lower quadrant/upper pelvis area suggested on coronal image 55 and 
axial images 60 to 66.



No other organomegaly or free fluid collections were suggested except for 
somewhat distended appearing stomach filled with air and fluid.  



The liver was not well seen.  There is a calcification adjacent to the 
gallbladder but no definite calculi within the gallbladder.  The pancreas 
overall had a normal size and shape with no definite focal mass lesions.  There 
were some calcifications in the head of the pancreas and a few scattered in the 
body and tail area which may be on the basis of previous bouts of pancreatitis. 
The relatively poorly visualized liver, adrenal glands, spleen, and kidneys 
appear fairly normal.  There is some renal fascial thickening which may be on 
the basis of minimal renal cortical scarring - previous bouts of pyelonephritis 
likely the cause.  



The appendix is absent compatible with history of its removal. 



Endplate compression is suggested inferiorly at L5 of indeterminate age with a 
few endplate compressions at the L2-3 interspace and inferiorly at L3 also.  
These are of indeterminate age.  



IMPRESSION: 

1.  Numerous loops of mildly dilated small bowel with air-fluid levels, somewhat
dilated stomach with air-fluid level.  Possibility of a distal small bowel 
obstruction would be a consideration with this appearance especially in the area
of the distal ileum-right lower quadrant-upper right pelvis - correlate 
clinically. 

2.  ASD.

3.  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 

4.  Mostly sigmoid diverticulosis without definite evidence of diverticulitis.  

5.  Calcifications compatible with bouts of previous pancreatitis.  No acute 
pancreatitis suggested at this time.  

6.  Minimal renal cortical scarring.

7.  Thickening of the wall of the urinary bladder may be on the basis of 
cystitis.  Urinary bladder catheter is noted in place. 



Report was called Dr. Gregory at 1119 hours.

[facility]

## 2020-07-06 NOTE — PCM.PN
- General Info


Date of Service: 07/06/20


Subjective Update: 


Confused.Also complains of RUQ pain.Not eating. 





- Review of Systems


Pulmonary: Reports: No Symptoms


Cardiovascular: Reports: No Symptoms


Genitourinary: Reports: No Symptoms


Musculoskeletal: Reports: No Symptoms





- Patient Data


Vitals - Most Recent: 


                                Last Vital Signs











Temp  97.8 F   07/06/20 00:00


 


Pulse  90   07/05/20 00:00


 


Resp  16   07/06/20 00:00


 


BP  131/87   07/06/20 00:00


 


Pulse Ox  100   07/06/20 00:00











Weight - Most Recent: 51.982 kg


I&O - Last 24 Hours: 


                                 Intake & Output











 07/05/20 07/06/20 07/06/20





 22:59 06:59 14:59


 


Intake Total 1065 1532 


 


Output Total 250 150 


 


Balance 815 1382 











Lab Results Last 24 Hours: 


                         Laboratory Results - last 24 hr











  07/06/20 Range/Units





  06:50 


 


Sodium  148 H  (135-145)  mmol/L


 


Potassium  2.4 L*  (3.5-5.3)  mmol/L


 


Chloride  109  (100-110)  mmol/L


 


Carbon Dioxide  25  (21-32)  mmol/L


 


BUN  22 H  (7-18)  mg/dL


 


Creatinine  1.2  (0.70-1.30)  mg/dL


 


Est Cr Clr Drug Dosing  37.90  mL/min


 


Estimated GFR (MDRD)  59 L  (>60)  


 


BUN/Creatinine Ratio  18.3  (9-20)  


 


Glucose  162 H  ()  mg/dL


 


Calcium  7.9 L  (8.6-10.2)  mg/dL


 


Total Bilirubin  0.9  (0.1-1.3)  mg/dL


 


AST  1506 H* D  (5-25)  IU/L


 


ALT  337 H* D  (12-36)  U/L


 


Alkaline Phosphatase  74  ()  IU/L


 


Total Protein  6.2  (6.0-8.0)  g/dL


 


Albumin  2.3 L  (3.2-4.6)  g/dL


 


Globulin  3.9  g/dL


 


Albumin/Globulin Ratio  0.6  











[place] Results Last 24 Hours: 


                                  Microbiology











 07/05/20 01:47 Urine Culture - Preliminary





 Urine, Voided    Gram Negative Rods











Med Orders - Current: 


                               Current Medications





Acetaminophen (Tylenol)  650 mg PO Q4H PRN


   PRN Reason: Pain (Mild 1-3)/fever


   Last Admin: 07/05/20 04:01 Dose:  650 mg


   Documented by: 


Aspirin (Halfprin)  81 mg PO DAILY [facility]


   Last Admin: 07/05/20 09:33 Dose:  81 mg


   Documented by: 


Ceftriaxone Sodium (Rocephin)  1 gm IVPUSH Q24H [facility]


   Last Admin: 07/05/20 09:43 Dose:  1 gm


   Documented by: 


Cholecalciferol (Vitamin D3)  125 mcg PO DAILY [facility]


   Last Admin: 07/05/20 11:08 Dose:  125 mcg


   Documented by: 


Potassium Chloride/Sodium Chloride (Normal Saline With 20 Meq Kcl)  1,000 mls @ 

100 mls/hr IV ASDIRECTED [facility]


   Last Admin: 07/06/20 04:20 Dose:  100 mls/hr


   Documented by: 


Potassium Chloride 20 meq/ (Premix)  100 mls @ 50 mls/hr IV Q6H [facility]


   Last Admin: 07/06/20 05:24 Dose:  50 mls/hr


   Documented by: 


Sodium Chloride (Normal Saline)  1,000 mls @ 999 mls/hr IV ASDIRECTED [facility]


Ibuprofen (Motrin)  600 mg PO Q6H PRN


   PRN Reason: Pain (mild 1-3)


Megestrol Acetate (Megace 40 Mg/Ml Susp)  400 mg PO DAILY [facility]


   Last Admin: 07/05/20 12:48 Dose:  400 mg


   Documented by: 


Mirtazapine (Remeron)  30 mg PO BEDTIME [facility]


   Last Admin: 07/05/20 20:24 Dose:  30 mg


   Documented by: 


Ondansetron HCl (Zofran Odt)  4 mg PO Q4H PRN


   PRN Reason: nausea, able to take PO


Potassium Chloride (Klor-Con M20)  40 meq PO TID [facility]


   Last Admin: 07/05/20 20:24 Dose:  40 meq


   Documented by: 


Sodium Chloride (Saline Flush)  10 ml FLUSH ASDIRECTED PRN


   PRN Reason: Keep Vein Open


   Last Admin: 07/05/20 18:30 Dose:  10 ml


   Documented by: 





Discontinued Medications





Cholecalciferol (Vitamin D3) Confirm Administered Dose 100 mcg .ROUTE .STK-MED 

ONE


   Stop: 07/05/20 09:58


   Last Admin: 07/05/20 11:08 Dose:  Not Given


   Documented by: 


Potassium Chloride 20 meq/ (Premix)  100 mls @ 50 mls/hr IV ONETIME ONE


   Stop: 07/04/20 11:58


   Last Admin: 07/04/20 10:24 Dose:  50 mls/hr


   Documented by: 


Potassium Chloride 20 meq/ (Premix)  100 mls @ 50 mls/hr IV ONETIME ONE


   Stop: 07/04/20 12:59


   Last Admin: 07/04/20 13:39 Dose:  50 mls/hr


   Documented by: 


Sodium Chloride (Normal Saline)  1,000 mls @ 125 mls/hr IV ASDIRECTED [facility]


Sodium Chloride (Normal Saline)  1,000 mls @ 999 mls/hr IV ONETIME ONE


   Stop: 07/04/20 18:45


   Last Admin: 07/04/20 19:41 Dose:  999 mls/hr


   Documented by: 


Lidocaine HCl (Glydo)  6 ml .XX ONETIME ONE


   Stop: 07/05/20 10:36


   Last Admin: 07/05/20 10:58 Dose:  6 ml


   Documented by: 


Nicotine (Habitrol)  21 mg TRDERM ONETIME ONE


   Stop: 07/04/20 17:58


   Last Admin: 07/04/20 18:36 Dose:  21 mg


   Documented by: 


Non-Formulary Medication (Iron [Iron])  27 mg PO DAILY [facility]


   Last Admin: 07/05/20 12:52 Dose:  Not Given


   Documented by: 


Potassium Chloride (Potassium Chloride)  20 meq IV ONETIME ONE


   Stop: 07/04/20 11:01











- Exam


General: Alert


HEENT: Pupils Equal


Neck: Supple


Lungs: Clear to Auscultation


Cardiovascular: Regular Rate


GI/Abdominal Exam: No Distention, Tender.  No: Mass





Sepsis Event Note





- Evaluation


Sepsis Screening Result: No Definite Risk





- Focused Exam


Vital Signs: 


                                   Vital Signs











  Temp Resp BP Pulse Ox


 


 07/06/20 00:00  97.8 F  16  131/87  100











Date Exam was Performed: 07/06/20


Time Exam was Performed: 08:08





- Problem List & Annotations


(1) Cachexia


SNOMED Code(s): 432132518


   Code(s): R64 - CACHEXIA   Status: Acute   Current Visit: Yes   





(2) Tobacco abuse


SNOMED Code(s): 477842514


   Code(s): Z72.0 - TOBACCO USE   Status: Acute   Current Visit: Yes   





(3) HTN (hypertension)


SNOMED Code(s): 91981740


   Code(s): I10 - ESSENTIAL (PRIMARY) HYPERTENSION   Status: Acute   Current 

Visit: Yes   


Qualifiers: 


   Hypertension type: essential hypertension   Qualified Code(s): I10 - 

Essential (primary) hypertension   





(4) MDD (major depressive disorder)


SNOMED Code(s): 961732085


   Code(s): F32.9 - MAJOR DEPRESSIVE DISORDER, SINGLE EPISODE, UNSPECIFIED   

Status: Acute   Current Visit: Yes   


Qualifiers: 


   Major depression recurrence: single episode 





(5) Hypokalemia


SNOMED Code(s): 75438782


   Code(s): E87.6 - HYPOKALEMIA   Status: Acute   Current Visit: Yes   





(6) Anorexia


SNOMED Code(s): 99481701


   Code(s): R63.0 - ANOREXIA   Status: Acute   Current Visit: Yes   





(7) DARREL (acute kidney injury)


SNOMED Code(s): 45454902, 09444734


   Code(s): N17.9 - ACUTE KIDNEY FAILURE, UNSPECIFIED   Status: Acute   Current 

Visit: Yes   





(8) Asymptomatic bacteriuria


SNOMED Code(s): 220906617


   Code(s): R82.71 - BACTERIURIA   Status: Acute   Current Visit: Yes   





(9) Abnormal LFTs (liver function tests)


SNOMED Code(s): 108110191


   Code(s): R94.5 - ABNORMAL RESULTS OF LIVER FUNCTION STUDIES   Status: Acute  

 Current Visit: Yes   





(10) Palliative care status


SNOMED Code(s): 941714285


   Code(s): Z51.5 - ENCOUNTER FOR PALLIATIVE CARE   Status: Acute   Current 

Visit: Yes   





- Problem List Review


Problem List Initiated/Reviewed/Updated: Yes





- My Orders


Last 24 Hours: 


My Active Orders





07/05/20 09:00


Aspirin [Halfprin]   81 mg PO DAILY 


Cholecalciferol (Vitamin D3) [Vitamin D3]   125 mcg PO DAILY 


Megestrol [Megace 40 MG/ML Susp]   400 mg PO DAILY 





07/05/20 09:13


OT Evaluation and Treatment [CONS] Routine 


PT Evaluation and Treatment [CONS] Routine 





07/05/20 09:37


Urinary Catheter Assessment [RC] QSHIFT 





07/05/20 09:45


[place] Catheter Insertion [Insert Urinary Catheter] [OM.PC] Q24H 





07/05/20 10:00


cefTRIAXone [Rocephin]   1 gm IVPUSH Q24H 





07/05/20 14:00


Potassium Chloride [Klor-Con M20]   40 meq PO TID 





07/06/20 08:01


Chest Abdomen Pelvis wo Cont [CT] Routine 





07/06/20 08:15


Sodium Chloride 0.9% [Normal Saline] 1,000 ml IV ASDIRECTED 





07/07/20 05:11


CBC WITH AUTO DIFF [HEME] AM 


COMPREHENSIVE METABOLIC PN,CMP [CHEM] AM 














- Plan


Plan:: 


 I wll obtian CT chest/abd pelvis. I talked with Weston-his son. Prognosis is 

poor.

## 2020-07-07 VITALS — SYSTOLIC BLOOD PRESSURE: 99 MMHG | DIASTOLIC BLOOD PRESSURE: 65 MMHG

## 2020-07-07 VITALS — HEART RATE: 132 BPM

## 2020-07-07 RX ADMIN — POTASSIUM CHLORIDE SCH MLS/HR: 200 INJECTION, SOLUTION INTRAVENOUS at 06:11

## 2020-07-07 RX ADMIN — POTASSIUM CHLORIDE SCH MLS/HR: 200 INJECTION, SOLUTION INTRAVENOUS at 00:59

## 2020-07-07 RX ADMIN — SODIUM CHLORIDE AND POTASSIUM CHLORIDE SCH: .9; .15 SOLUTION INTRAVENOUS at 04:48

## 2020-07-07 RX ADMIN — SODIUM CHLORIDE AND POTASSIUM CHLORIDE SCH MLS/HR: .9; .15 SOLUTION INTRAVENOUS at 04:27

## 2020-07-07 RX ADMIN — Medication PRN ML: at 10:50

## 2020-07-07 RX ADMIN — Medication PRN ML: at 09:00

## 2020-07-07 RX ADMIN — Medication PRN ML: at 12:32

## 2020-07-07 RX ADMIN — Medication PRN ML: at 09:52

## 2020-07-08 NOTE — DISCH
DISCHARGE DATE:  2020

 

DATE OF DEATH:  2020

 

REASON FOR ADMISSION:

1. Hypokalemia.

2. Cachexia.

3. Tobacco abuse.

4. Hypertension.

5. Dehydration.

 

REASON FOR DEATH:  Acute multiorgan failure secondary to fulminant hepatic

failure, respiratory distress.

 

BRIEF HISTORY:  A 75-year-old, who came in because of weakness, was found to

have potassium of less than 1.5.  He has lost a lot of weight.  He is known to

smoke more than 2 packs of cigarettes per day.  He was admitted, treated with IV

fluids and potassium replacement. Improved the potassium, however, his

respiratory status worsened, and his liver functions went up.  He  on

2020 after the family withdrew care.

 

I spent more than 35 minutes in the discharge.

 

Job#: [id number]

DD: 2020 1720

DT: 2020 1733 [id number]